# Patient Record
Sex: FEMALE | Race: WHITE | NOT HISPANIC OR LATINO | Employment: OTHER | URBAN - METROPOLITAN AREA
[De-identification: names, ages, dates, MRNs, and addresses within clinical notes are randomized per-mention and may not be internally consistent; named-entity substitution may affect disease eponyms.]

---

## 2017-11-07 ENCOUNTER — OFFICE VISIT (OUTPATIENT)
Dept: OPHTHALMOLOGY | Facility: CLINIC | Age: 72
End: 2017-11-07
Payer: MEDICARE

## 2017-11-07 DIAGNOSIS — H25.11 SENILE NUCLEAR SCLEROSIS, RIGHT: ICD-10-CM

## 2017-11-07 DIAGNOSIS — H18.519 FUCHS' CORNEAL DYSTROPHY: ICD-10-CM

## 2017-11-07 DIAGNOSIS — H04.123 DRY EYE SYNDROME, BILATERAL: ICD-10-CM

## 2017-11-07 DIAGNOSIS — H52.7 REFRACTIVE ERROR: ICD-10-CM

## 2017-11-07 DIAGNOSIS — H25.12 SENILE NUCLEAR SCLEROSIS, LEFT: Primary | ICD-10-CM

## 2017-11-07 PROCEDURE — 92014 COMPRE OPH EXAM EST PT 1/>: CPT | Mod: S$GLB,,, | Performed by: OPHTHALMOLOGY

## 2017-11-07 PROCEDURE — 99999 PR PBB SHADOW E&M-EST. PATIENT-LVL II: CPT | Mod: PBBFAC,,, | Performed by: OPHTHALMOLOGY

## 2017-11-07 RX ORDER — VALSARTAN 40 MG/1
TABLET ORAL
COMMUNITY
Start: 2017-08-16 | End: 2017-11-07

## 2017-11-07 NOTE — PROGRESS NOTES
HPI     Eye Exam    Additional comments: yearly exam. no complaints           Comments   1. NS Cataracts  2. Dry eye       Last edited by Xiomy Vides MA on 11/7/2017 10:18 AM. (History)            Assessment /Plan     For exam results, see Encounter Report.      ICD-10-CM ICD-9-CM    1. 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 Dry eye syndrome, bilateral H04.123 375.15 Use Systane    3. Senile nuclear sclerosis, right H25.11 366.16 Follow    4. Fuchs' corneal dystrophy H18.51 371.57 Appears stable    5. Refractive error H52.7 367.9        RETURN TO CLINIC 1 year

## 2018-11-13 ENCOUNTER — OFFICE VISIT (OUTPATIENT)
Dept: OPHTHALMOLOGY | Facility: CLINIC | Age: 73
End: 2018-11-13
Payer: MEDICARE

## 2018-11-13 DIAGNOSIS — H25.12 SENILE NUCLEAR SCLEROSIS, LEFT: Primary | ICD-10-CM

## 2018-11-13 DIAGNOSIS — H18.519 FUCHS' CORNEAL DYSTROPHY: ICD-10-CM

## 2018-11-13 DIAGNOSIS — H52.7 REFRACTION DISORDER: ICD-10-CM

## 2018-11-13 DIAGNOSIS — H04.123 DRY EYE SYNDROME, BILATERAL: ICD-10-CM

## 2018-11-13 DIAGNOSIS — H25.11 SENILE NUCLEAR SCLEROSIS, RIGHT: ICD-10-CM

## 2018-11-13 PROCEDURE — 99999 PR PBB SHADOW E&M-EST. PATIENT-LVL II: CPT | Mod: PBBFAC,,, | Performed by: OPHTHALMOLOGY

## 2018-11-13 PROCEDURE — 92015 DETERMINE REFRACTIVE STATE: CPT | Mod: S$GLB,,, | Performed by: OPHTHALMOLOGY

## 2018-11-13 PROCEDURE — 92014 COMPRE OPH EXAM EST PT 1/>: CPT | Mod: S$GLB,,, | Performed by: OPHTHALMOLOGY

## 2018-11-13 NOTE — PROGRESS NOTES
HPI     Cataract      Additional comments: 1 year follow up              Comments     The patient states her eyes are doing good and she denies any visual   changes. The patient states she is still using the systane for her dry   eyes and it is helping.    1. NS Cataracts  2. Dry eye  3. Fuchs' corneal dystrophy  4. Refractive error    Uses systane prn ou          Last edited by Johanne Serrato on 11/13/2018  9:32 AM. (History)            Assessment /Plan     For exam results, see Encounter Report.      ICD-10-CM ICD-9-CM    1. 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 We will recheck your cataract at your next visit. You are welcome to call for an earlier appointment if your vision gets worse.      2. Senile nuclear sclerosis, right H25.11 366.16 Same as above    3. Fuchs' corneal dystrophy H18.51 371.57 Appears stable, follow    4. Dry eye syndrome, bilateral H04.123 375.15 Continue systane prn     Disp Mr  Return to clinic 1 year or PRN

## 2019-11-13 ENCOUNTER — OFFICE VISIT (OUTPATIENT)
Dept: OPHTHALMOLOGY | Facility: CLINIC | Age: 74
End: 2019-11-13
Payer: MEDICARE

## 2019-11-13 DIAGNOSIS — H04.123 DRY EYE SYNDROME, BILATERAL: ICD-10-CM

## 2019-11-13 DIAGNOSIS — H52.7 REFRACTION DISORDER: ICD-10-CM

## 2019-11-13 DIAGNOSIS — H25.12 SENILE NUCLEAR SCLEROSIS, LEFT: Primary | ICD-10-CM

## 2019-11-13 DIAGNOSIS — H25.11 SENILE NUCLEAR SCLEROSIS, RIGHT: ICD-10-CM

## 2019-11-13 DIAGNOSIS — H18.519 FUCHS' CORNEAL DYSTROPHY: ICD-10-CM

## 2019-11-13 PROCEDURE — 92015 DETERMINE REFRACTIVE STATE: CPT | Mod: S$GLB,,, | Performed by: OPHTHALMOLOGY

## 2019-11-13 PROCEDURE — 92014 COMPRE OPH EXAM EST PT 1/>: CPT | Mod: S$GLB,,, | Performed by: OPHTHALMOLOGY

## 2019-11-13 PROCEDURE — 92014 PR EYE EXAM, EST PATIENT,COMPREHESV: ICD-10-PCS | Mod: S$GLB,,, | Performed by: OPHTHALMOLOGY

## 2019-11-13 PROCEDURE — 92015 PR REFRACTION: ICD-10-PCS | Mod: S$GLB,,, | Performed by: OPHTHALMOLOGY

## 2019-11-13 PROCEDURE — 99999 PR PBB SHADOW E&M-EST. PATIENT-LVL I: ICD-10-PCS | Mod: PBBFAC,,, | Performed by: OPHTHALMOLOGY

## 2019-11-13 PROCEDURE — 99999 PR PBB SHADOW E&M-EST. PATIENT-LVL I: CPT | Mod: PBBFAC,,, | Performed by: OPHTHALMOLOGY

## 2019-11-13 NOTE — PROGRESS NOTES
HPI     Annual Exam      Additional comments: Complete Ocular Exam              Comments     Patient feels OU is doing well, no changes in VA and states she is happy   with current glasses.    1. NS Cataracts  2. Dry eye  3. Fuchs' corneal dystrophy  4. Refractive Error      Uses systane prn ou          Last edited by Yadira Allan, Patient Care Assistant on 11/13/2019  1:17   PM. (History)            Assessment /Plan     For exam results, see Encounter Report.    Senile nuclear sclerosis, left    Senile nuclear sclerosis, right    Fuchs' corneal dystrophy    Dry eye syndrome, bilateral

## 2019-11-13 NOTE — PROGRESS NOTES
HPI     Annual Exam      Additional comments: Complete Ocular Exam              Comments     Patient feels OU is doing well, no changes in VA and states she is happy   with current glasses.    1. NS Cataracts  2. Dry eye  3. Fuchs' corneal dystrophy  4. Refractive Error      Uses systane prn ou  Patient lost her  Ramiro Keene in April 2019          Last edited by Yadira Allan, Patient Care Assistant on 11/13/2019  1:21   PM. (History)            Assessment /Plan     For exam results, see Encounter Report.      ICD-10-CM ICD-9-CM    1. 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 Senile nuclear sclerosis, right H25.11 366.16 As above   3. Fuchs' corneal dystrophy H18.51 371.57 follow   4. Dry eye syndrome, bilateral H04.123 375.15 Findings and symptoms consistent with mild dry eyes.   Recommend regular use of Artificial Tears. These should be thought of as Ocular Surface Moisturizers similar to skin moisturizers in that regular use is required to achieve maximum benefit.  Specifically, I recommend the following:    Systane Ultra 3-4 times a day.   The first dose upon awakening is most important because we do not make tears at night.  Avoid generic products as they contain Benzalkonium Chloride as a preservative. This is a very irritating chemical and can make your eyes worse.               Disp Mr    Return to clinic 1 year

## 2020-12-09 ENCOUNTER — OFFICE VISIT (OUTPATIENT)
Dept: OPHTHALMOLOGY | Facility: CLINIC | Age: 75
End: 2020-12-09
Payer: COMMERCIAL

## 2020-12-09 DIAGNOSIS — H04.123 DRY EYE SYNDROME, BILATERAL: ICD-10-CM

## 2020-12-09 DIAGNOSIS — H25.11 SENILE NUCLEAR SCLEROSIS, RIGHT: ICD-10-CM

## 2020-12-09 DIAGNOSIS — H52.7 REFRACTION DISORDER: ICD-10-CM

## 2020-12-09 DIAGNOSIS — H25.12 SENILE NUCLEAR SCLEROSIS, LEFT: ICD-10-CM

## 2020-12-09 DIAGNOSIS — H18.519 FUCHS' CORNEAL DYSTROPHY: Primary | ICD-10-CM

## 2020-12-09 PROCEDURE — 92014 PR EYE EXAM, EST PATIENT,COMPREHESV: ICD-10-PCS | Mod: S$GLB,,, | Performed by: OPHTHALMOLOGY

## 2020-12-09 PROCEDURE — 92014 COMPRE OPH EXAM EST PT 1/>: CPT | Mod: S$GLB,,, | Performed by: OPHTHALMOLOGY

## 2020-12-09 PROCEDURE — 99999 PR PBB SHADOW E&M-EST. PATIENT-LVL II: ICD-10-PCS | Mod: PBBFAC,,, | Performed by: OPHTHALMOLOGY

## 2020-12-09 PROCEDURE — 92015 PR REFRACTION: ICD-10-PCS | Mod: S$GLB,,, | Performed by: OPHTHALMOLOGY

## 2020-12-09 PROCEDURE — 99999 PR PBB SHADOW E&M-EST. PATIENT-LVL II: CPT | Mod: PBBFAC,,, | Performed by: OPHTHALMOLOGY

## 2020-12-09 PROCEDURE — 92015 DETERMINE REFRACTIVE STATE: CPT | Mod: S$GLB,,, | Performed by: OPHTHALMOLOGY

## 2020-12-09 NOTE — PROGRESS NOTES
HPI     Annual Exam     Comments: yearly exam              Comments     States that her vision is stable denies any issues at this time.  1. NS Cataracts  2. Dry eye  3. Fuchs' corneal dystrophy  4. Refractive Error      Uses systane prn ou    Patient lost her  Ramiro Keene in April 2019          Last edited by Loree Pardo on 12/9/2020  8:37 AM. (History)            Assessment /Plan     For exam results, see Encounter Report.      ICD-10-CM ICD-9-CM    1. Fuchs' corneal dystrophy  H18.519 371.57 follow for now   2. Senile nuclear sclerosis, left  H25.12 366.16 Follow for now   3. Senile nuclear sclerosis, right  H25.11 366.16 As above   4. Dry eye syndrome, bilateral  H04.123 375.15 Doing well with Systane   5. Refraction disorder  H52.7 367.9      Disp Mr  Return to clinic 1 year/prn

## 2021-01-04 ENCOUNTER — IMMUNIZATION (OUTPATIENT)
Dept: INTERNAL MEDICINE | Facility: CLINIC | Age: 76
End: 2021-01-04
Payer: MEDICARE

## 2021-01-04 DIAGNOSIS — Z23 NEED FOR VACCINATION: ICD-10-CM

## 2021-01-04 PROCEDURE — 91300 COVID-19, MRNA, LNP-S, PF, 30 MCG/0.3 ML DOSE VACCINE: CPT | Mod: PBBFAC | Performed by: FAMILY MEDICINE

## 2021-01-25 ENCOUNTER — IMMUNIZATION (OUTPATIENT)
Dept: INTERNAL MEDICINE | Facility: CLINIC | Age: 76
End: 2021-01-25
Payer: MEDICARE

## 2021-01-25 DIAGNOSIS — Z23 NEED FOR VACCINATION: Primary | ICD-10-CM

## 2021-01-25 PROCEDURE — 0002A COVID-19, MRNA, LNP-S, PF, 30 MCG/0.3 ML DOSE VACCINE: CPT | Mod: PBBFAC | Performed by: FAMILY MEDICINE

## 2021-01-25 PROCEDURE — 91300 COVID-19, MRNA, LNP-S, PF, 30 MCG/0.3 ML DOSE VACCINE: CPT | Mod: PBBFAC | Performed by: FAMILY MEDICINE

## 2021-09-25 ENCOUNTER — IMMUNIZATION (OUTPATIENT)
Dept: PRIMARY CARE CLINIC | Facility: CLINIC | Age: 76
End: 2021-09-25
Payer: MEDICARE

## 2021-09-25 DIAGNOSIS — Z23 NEED FOR VACCINATION: Primary | ICD-10-CM

## 2021-09-25 PROCEDURE — 0003A COVID-19, MRNA, LNP-S, PF, 30 MCG/0.3 ML DOSE VACCINE: CPT | Mod: CV19,PBBFAC | Performed by: FAMILY MEDICINE

## 2021-09-25 PROCEDURE — 91300 COVID-19, MRNA, LNP-S, PF, 30 MCG/0.3 ML DOSE VACCINE: CPT | Mod: PBBFAC | Performed by: FAMILY MEDICINE

## 2021-12-15 ENCOUNTER — OFFICE VISIT (OUTPATIENT)
Dept: OPHTHALMOLOGY | Facility: CLINIC | Age: 76
End: 2021-12-15
Payer: MEDICARE

## 2021-12-15 DIAGNOSIS — H25.12 SENILE NUCLEAR SCLEROSIS, LEFT: ICD-10-CM

## 2021-12-15 DIAGNOSIS — H25.11 SENILE NUCLEAR SCLEROSIS, RIGHT: ICD-10-CM

## 2021-12-15 DIAGNOSIS — H04.123 DRY EYE SYNDROME, BILATERAL: ICD-10-CM

## 2021-12-15 DIAGNOSIS — H52.7 REFRACTION DISORDER: ICD-10-CM

## 2021-12-15 DIAGNOSIS — H18.513 FUCHS' CORNEAL DYSTROPHY OF BOTH EYES: Primary | ICD-10-CM

## 2021-12-15 PROCEDURE — 92015 DETERMINE REFRACTIVE STATE: CPT | Mod: S$GLB,,, | Performed by: OPHTHALMOLOGY

## 2021-12-15 PROCEDURE — 4010F PR ACE/ARB THEARPY RXD/TAKEN: ICD-10-PCS | Mod: CPTII,S$GLB,, | Performed by: OPHTHALMOLOGY

## 2021-12-15 PROCEDURE — 99999 PR PBB SHADOW E&M-EST. PATIENT-LVL III: ICD-10-PCS | Mod: PBBFAC,,, | Performed by: OPHTHALMOLOGY

## 2021-12-15 PROCEDURE — 99999 PR PBB SHADOW E&M-EST. PATIENT-LVL III: CPT | Mod: PBBFAC,,, | Performed by: OPHTHALMOLOGY

## 2021-12-15 PROCEDURE — 92015 PR REFRACTION: ICD-10-PCS | Mod: S$GLB,,, | Performed by: OPHTHALMOLOGY

## 2021-12-15 PROCEDURE — 99214 PR OFFICE/OUTPT VISIT, EST, LEVL IV, 30-39 MIN: ICD-10-PCS | Mod: S$GLB,,, | Performed by: OPHTHALMOLOGY

## 2021-12-15 PROCEDURE — 99214 OFFICE O/P EST MOD 30 MIN: CPT | Mod: S$GLB,,, | Performed by: OPHTHALMOLOGY

## 2021-12-15 PROCEDURE — 4010F ACE/ARB THERAPY RXD/TAKEN: CPT | Mod: CPTII,S$GLB,, | Performed by: OPHTHALMOLOGY

## 2021-12-15 RX ORDER — LOSARTAN POTASSIUM 25 MG/1
1 TABLET ORAL DAILY
COMMUNITY
Start: 2021-12-08 | End: 2022-12-08

## 2021-12-20 ENCOUNTER — TELEPHONE (OUTPATIENT)
Dept: OPHTHALMOLOGY | Facility: CLINIC | Age: 76
End: 2021-12-20
Payer: MEDICARE

## 2022-05-06 ENCOUNTER — IMMUNIZATION (OUTPATIENT)
Dept: PRIMARY CARE CLINIC | Facility: CLINIC | Age: 77
End: 2022-05-06
Payer: MEDICARE

## 2022-05-06 DIAGNOSIS — Z23 NEED FOR VACCINATION: Primary | ICD-10-CM

## 2022-05-06 PROCEDURE — 91305 COVID-19, MRNA, LNP-S, PF, 30 MCG/0.3 ML DOSE VACCINE (PFIZER): CPT | Mod: PBBFAC | Performed by: FAMILY MEDICINE

## 2022-09-14 ENCOUNTER — IMMUNIZATION (OUTPATIENT)
Dept: PRIMARY CARE CLINIC | Facility: CLINIC | Age: 77
End: 2022-09-14
Payer: MEDICARE

## 2022-09-14 DIAGNOSIS — Z23 NEED FOR VACCINATION: Primary | ICD-10-CM

## 2022-09-14 PROCEDURE — 91312 COVID-19, MRNA, LNP-S, BIVALENT BOOSTER, PF, 30 MCG/0.3 ML DOSE: CPT | Mod: S$GLB,,, | Performed by: FAMILY MEDICINE

## 2022-09-14 PROCEDURE — 91312 COVID-19, MRNA, LNP-S, BIVALENT BOOSTER, PF, 30 MCG/0.3 ML DOSE: ICD-10-PCS | Mod: S$GLB,,, | Performed by: FAMILY MEDICINE

## 2022-12-16 ENCOUNTER — OFFICE VISIT (OUTPATIENT)
Dept: OPHTHALMOLOGY | Facility: CLINIC | Age: 77
End: 2022-12-16
Payer: MEDICARE

## 2022-12-16 DIAGNOSIS — H18.513 FUCHS' CORNEAL DYSTROPHY OF BOTH EYES: ICD-10-CM

## 2022-12-16 DIAGNOSIS — H25.11 SENILE NUCLEAR SCLEROSIS, RIGHT: ICD-10-CM

## 2022-12-16 DIAGNOSIS — H04.123 DRY EYE SYNDROME, BILATERAL: ICD-10-CM

## 2022-12-16 DIAGNOSIS — H25.12 SENILE NUCLEAR SCLEROSIS, LEFT: Primary | ICD-10-CM

## 2022-12-16 PROBLEM — H90.3 BILATERAL SENSORINEURAL HEARING LOSS: Status: ACTIVE | Noted: 2017-06-12

## 2022-12-16 PROCEDURE — 1159F MED LIST DOCD IN RCRD: CPT | Mod: CPTII,S$GLB,, | Performed by: OPHTHALMOLOGY

## 2022-12-16 PROCEDURE — 1160F RVW MEDS BY RX/DR IN RCRD: CPT | Mod: CPTII,S$GLB,, | Performed by: OPHTHALMOLOGY

## 2022-12-16 PROCEDURE — 92014 COMPRE OPH EXAM EST PT 1/>: CPT | Mod: S$GLB,,, | Performed by: OPHTHALMOLOGY

## 2022-12-16 PROCEDURE — 1160F PR REVIEW ALL MEDS BY PRESCRIBER/CLIN PHARMACIST DOCUMENTED: ICD-10-PCS | Mod: CPTII,S$GLB,, | Performed by: OPHTHALMOLOGY

## 2022-12-16 PROCEDURE — 1159F PR MEDICATION LIST DOCUMENTED IN MEDICAL RECORD: ICD-10-PCS | Mod: CPTII,S$GLB,, | Performed by: OPHTHALMOLOGY

## 2022-12-16 PROCEDURE — 99999 PR PBB SHADOW E&M-EST. PATIENT-LVL III: ICD-10-PCS | Mod: PBBFAC,,, | Performed by: OPHTHALMOLOGY

## 2022-12-16 PROCEDURE — 99999 PR PBB SHADOW E&M-EST. PATIENT-LVL III: CPT | Mod: PBBFAC,,, | Performed by: OPHTHALMOLOGY

## 2022-12-16 PROCEDURE — 92014 PR EYE EXAM, EST PATIENT,COMPREHESV: ICD-10-PCS | Mod: S$GLB,,, | Performed by: OPHTHALMOLOGY

## 2022-12-16 NOTE — PROGRESS NOTES
HPI     Annual Exam            Comments: Pt here for annual exam. No pain or discomfort. VA stable.           Comments    1. NS Cataracts  2. Dry eye  3. Fuchs' corneal dystrophy  4. Refractive Error      Uses systane prn ou            Last edited by Brown Luna MA on 12/16/2022  9:20 AM.            Assessment /Plan     For exam results, see Encounter Report.      ICD-10-CM ICD-9-CM    1. Senile nuclear sclerosis, left  H25.12 366.16   You were found to have an early cataract in your eye(s) today, however the cataract is not affecting your activities of daily living, such as reading and driving.You do not need  surgery at this time. We will recheck your cataract at your next visit. You are welcome to call for an earlier appointment if your vision gets worse.         2. Senile nuclear sclerosis, right  H25.11 366.16   You were found to have an early cataract in your eye(s) today, however the cataract is not affecting your activities of daily living, such as reading and driving.You do not need  surgery at this time. We will recheck your cataract at your next visit. You are welcome to call for an earlier appointment if your vision gets worse.         3. Fuchs' corneal dystrophy of both eyes  H18.513 371.57 Stable at this time       4. Dry eye syndrome, bilateral  H04.123 375.15 Continue tears           RETURN TO CLINIC 1 year

## 2023-12-19 ENCOUNTER — OFFICE VISIT (OUTPATIENT)
Dept: OPHTHALMOLOGY | Facility: CLINIC | Age: 78
End: 2023-12-19
Payer: MEDICARE

## 2023-12-19 DIAGNOSIS — H52.7 REFRACTION DISORDER: ICD-10-CM

## 2023-12-19 DIAGNOSIS — H25.11 SENILE NUCLEAR SCLEROSIS, RIGHT: ICD-10-CM

## 2023-12-19 DIAGNOSIS — H40.003 GLAUCOMA SUSPECT OF BOTH EYES: ICD-10-CM

## 2023-12-19 DIAGNOSIS — H04.123 DRY EYE SYNDROME, BILATERAL: ICD-10-CM

## 2023-12-19 DIAGNOSIS — H25.12 SENILE NUCLEAR SCLEROSIS, LEFT: Primary | ICD-10-CM

## 2023-12-19 DIAGNOSIS — H18.513 FUCHS' CORNEAL DYSTROPHY OF BOTH EYES: ICD-10-CM

## 2023-12-19 DIAGNOSIS — H40.023 AT HIGH RISK FOR BILATERAL OPEN ANGLE GLAUCOMA: ICD-10-CM

## 2023-12-19 PROCEDURE — 99999 PR PBB SHADOW E&M-EST. PATIENT-LVL III: ICD-10-PCS | Mod: PBBFAC,,, | Performed by: OPHTHALMOLOGY

## 2023-12-19 PROCEDURE — 1159F PR MEDICATION LIST DOCUMENTED IN MEDICAL RECORD: ICD-10-PCS | Mod: CPTII,S$GLB,, | Performed by: OPHTHALMOLOGY

## 2023-12-19 PROCEDURE — 1160F RVW MEDS BY RX/DR IN RCRD: CPT | Mod: CPTII,S$GLB,, | Performed by: OPHTHALMOLOGY

## 2023-12-19 PROCEDURE — 92014 COMPRE OPH EXAM EST PT 1/>: CPT | Mod: S$GLB,,, | Performed by: OPHTHALMOLOGY

## 2023-12-19 PROCEDURE — 92015 DETERMINE REFRACTIVE STATE: CPT | Mod: S$GLB,,, | Performed by: OPHTHALMOLOGY

## 2023-12-19 PROCEDURE — 92015 PR REFRACTION: ICD-10-PCS | Mod: S$GLB,,, | Performed by: OPHTHALMOLOGY

## 2023-12-19 PROCEDURE — 92133 POSTERIOR SEGMENT OCT OPTIC NERVE(OCULAR COHERENCE TOMOGRAPHY) - OU - BOTH EYES: ICD-10-PCS | Mod: S$GLB,,, | Performed by: OPHTHALMOLOGY

## 2023-12-19 PROCEDURE — 1159F MED LIST DOCD IN RCRD: CPT | Mod: CPTII,S$GLB,, | Performed by: OPHTHALMOLOGY

## 2023-12-19 PROCEDURE — 92133 CPTRZD OPH DX IMG PST SGM ON: CPT | Mod: S$GLB,,, | Performed by: OPHTHALMOLOGY

## 2023-12-19 PROCEDURE — 1160F PR REVIEW ALL MEDS BY PRESCRIBER/CLIN PHARMACIST DOCUMENTED: ICD-10-PCS | Mod: CPTII,S$GLB,, | Performed by: OPHTHALMOLOGY

## 2023-12-19 PROCEDURE — 92014 PR EYE EXAM, EST PATIENT,COMPREHESV: ICD-10-PCS | Mod: S$GLB,,, | Performed by: OPHTHALMOLOGY

## 2023-12-19 PROCEDURE — 99999 PR PBB SHADOW E&M-EST. PATIENT-LVL III: CPT | Mod: PBBFAC,,, | Performed by: OPHTHALMOLOGY

## 2023-12-19 NOTE — PROGRESS NOTES
HPI     Cataract            Comments: Patient reports for yearly eye exam. Denies pain or irritation.             Comments    1. NS Cataracts  2. Dry eye  3. Fuchs' corneal dystrophy  4. Refractive Error      Uses systane prn ou             Last edited by Carlos Pina on 12/19/2023 10:15 AM.            Assessment /Plan     For exam results, see Encounter Report.      ICD-10-CM ICD-9-CM    1. Senile nuclear sclerosis, left  H25.12 366.16 Mild, follow at this time       2. Senile nuclear sclerosis, right  H25.11 366.16 Mild, follow at this time       3. Fuchs' corneal dystrophy of both eyes  H18.513 371.57 Present OU       4. Dry eye syndrome, bilateral  H04.123 375.15 Continue tears       5. Refraction disorder  H52.7 367.9 Disp MR       7.  Glaucoma Suspect- today based on  trace onh changes- goct normal and IOP stable- follow yearly     RETURN TO CLINIC 1 year with goct, mr, glare and doa

## 2024-12-17 ENCOUNTER — OFFICE VISIT (OUTPATIENT)
Dept: OPHTHALMOLOGY | Facility: CLINIC | Age: 79
End: 2024-12-17
Payer: MEDICARE

## 2024-12-17 DIAGNOSIS — H40.003 GLAUCOMA SUSPECT OF BOTH EYES: ICD-10-CM

## 2024-12-17 DIAGNOSIS — H04.123 DRY EYE SYNDROME, BILATERAL: ICD-10-CM

## 2024-12-17 DIAGNOSIS — H18.513 FUCHS' CORNEAL DYSTROPHY OF BOTH EYES: ICD-10-CM

## 2024-12-17 DIAGNOSIS — H40.023 AT HIGH RISK FOR BILATERAL OPEN ANGLE GLAUCOMA: ICD-10-CM

## 2024-12-17 DIAGNOSIS — H25.13 NUCLEAR SENILE CATARACT OF BOTH EYES: Primary | ICD-10-CM

## 2024-12-17 PROCEDURE — 99999 PR PBB SHADOW E&M-EST. PATIENT-LVL III: CPT | Mod: PBBFAC,,, | Performed by: OPHTHALMOLOGY

## 2024-12-17 PROCEDURE — 1159F MED LIST DOCD IN RCRD: CPT | Mod: CPTII,S$GLB,, | Performed by: OPHTHALMOLOGY

## 2024-12-17 PROCEDURE — 1160F RVW MEDS BY RX/DR IN RCRD: CPT | Mod: CPTII,S$GLB,, | Performed by: OPHTHALMOLOGY

## 2024-12-17 PROCEDURE — 92133 CPTRZD OPH DX IMG PST SGM ON: CPT | Mod: S$GLB,,, | Performed by: OPHTHALMOLOGY

## 2024-12-17 PROCEDURE — 92014 COMPRE OPH EXAM EST PT 1/>: CPT | Mod: S$GLB,,, | Performed by: OPHTHALMOLOGY

## 2024-12-17 NOTE — PROGRESS NOTES
HPI     Annual Exam            Comments: Pt here for annual exam    Pt denies pain or changes with OU va x 1 yr  Pt states she does not drive at night   Using systane prn ou           Comments    1. NS Cataracts  2. Dry eye  3. Fuchs' corneal dystrophy  4. Refractive Error  5. Coag susp = based on onh thinning od only - normal oct       Uses systane prn ou    Patient lost her  Ramiro Keene in April 2019             Last edited by Xavier Razo on 12/17/2024 10:39 AM.            Assessment /Plan     For exam results, see Encounter Report.      ICD-10-CM ICD-9-CM    1. Nuclear senile cataract of both eyes  H25.13 366.16 You were found to have an early cataract in your eye(s) today. However the cataract is not affecting your activities of daily living, such as reading and driving. You do not need surgery at this time. We will recheck your cataract at your next visit. You are welcome to call for an earlier appointment if your vision gets worse.         2. At high risk for bilateral open angle glaucoma  H40.023 365.05 Posterior Segment OCT Optic Nerve- Both eyes   Appears stable       3. Fuchs' corneal dystrophy of both eyes  H18.513 371.57 Follow       4. Dry eye syndrome, bilateral  H04.123 375.15 Continue tears       5. Glaucoma suspect of both eyes  H40.003 365.00 Posterior Segment OCT Optic Nerve- Both eyes  Appears stable           RETURN TO CLINIC 1 year with goct